# Patient Record
Sex: MALE | Race: WHITE | Employment: FULL TIME
[De-identification: names, ages, dates, MRNs, and addresses within clinical notes are randomized per-mention and may not be internally consistent; named-entity substitution may affect disease eponyms.]

---

## 2017-02-21 ENCOUNTER — SURGERY (OUTPATIENT)
Age: 27
End: 2017-02-21

## 2017-02-21 ENCOUNTER — ANESTHESIA (OUTPATIENT)
Dept: SURGERY | Facility: HOSPITAL | Age: 27
DRG: 339 | End: 2017-02-21
Payer: COMMERCIAL

## 2017-02-21 ENCOUNTER — APPOINTMENT (OUTPATIENT)
Dept: CT IMAGING | Facility: HOSPITAL | Age: 27
DRG: 339 | End: 2017-02-21
Attending: NURSE PRACTITIONER
Payer: COMMERCIAL

## 2017-02-21 ENCOUNTER — HOSPITAL ENCOUNTER (INPATIENT)
Facility: HOSPITAL | Age: 27
LOS: 3 days | Discharge: HOME | DRG: 339 | End: 2017-02-24
Attending: SURGERY | Admitting: HOSPITALIST
Payer: COMMERCIAL

## 2017-02-21 ENCOUNTER — ANESTHESIA EVENT (OUTPATIENT)
Dept: SURGERY | Facility: HOSPITAL | Age: 27
DRG: 339 | End: 2017-02-21
Payer: COMMERCIAL

## 2017-02-21 DIAGNOSIS — K35.80 ACUTE APPENDICITIS, UNSPECIFIED ACUTE APPENDICITIS TYPE: Primary | ICD-10-CM

## 2017-02-21 LAB
ALBUMIN SERPL BCP-MCNC: 4.6 G/DL (ref 3.5–4.8)
ALP SERPL-CCNC: 95 U/L (ref 32–100)
ALT SERPL-CCNC: 39 U/L (ref 17–63)
ANION GAP SERPL CALC-SCNC: 12 MMOL/L (ref 0–18)
AST SERPL-CCNC: 29 U/L (ref 15–41)
BACTERIA UR QL AUTO: NEGATIVE /HPF
BASOPHILS # BLD: 0 K/UL (ref 0–0.2)
BASOPHILS NFR BLD: 0 %
BILIRUB DIRECT SERPL-MCNC: 0.2 MG/DL (ref 0–0.2)
BILIRUB SERPL-MCNC: 1.3 MG/DL (ref 0.3–1.2)
BILIRUB UR QL: NEGATIVE
BUN SERPL-MCNC: 9 MG/DL (ref 8–20)
BUN/CREAT SERPL: 9.8 (ref 10–20)
CALCIUM SERPL-MCNC: 10.1 MG/DL (ref 8.5–10.5)
CHLORIDE SERPL-SCNC: 102 MMOL/L (ref 95–110)
CO2 SERPL-SCNC: 26 MMOL/L (ref 22–32)
COLOR UR: YELLOW
CREAT SERPL-MCNC: 0.92 MG/DL (ref 0.5–1.5)
EOSINOPHIL # BLD: 0.1 K/UL (ref 0–0.7)
EOSINOPHIL NFR BLD: 1 %
ERYTHROCYTE [DISTWIDTH] IN BLOOD BY AUTOMATED COUNT: 12.3 % (ref 11–15)
GLUCOSE SERPL-MCNC: 108 MG/DL (ref 70–99)
GLUCOSE UR-MCNC: NEGATIVE MG/DL
HCT VFR BLD AUTO: 49.5 % (ref 41–52)
HGB BLD-MCNC: 16.8 G/DL (ref 13.5–17.5)
HGB UR QL STRIP.AUTO: NEGATIVE
KETONES UR-MCNC: 20 MG/DL
LEUKOCYTE ESTERASE UR QL STRIP.AUTO: NEGATIVE
LIPASE SERPL-CCNC: 34 U/L (ref 22–51)
LYMPHOCYTES # BLD: 1.4 K/UL (ref 1–4)
LYMPHOCYTES NFR BLD: 9 %
MCH RBC QN AUTO: 30.2 PG (ref 27–32)
MCHC RBC AUTO-ENTMCNC: 34 G/DL (ref 32–37)
MCV RBC AUTO: 88.7 FL (ref 80–100)
MONOCYTES # BLD: 1 K/UL (ref 0–1)
MONOCYTES NFR BLD: 6 %
NEUTROPHILS # BLD AUTO: 13.5 K/UL (ref 1.8–7.7)
NEUTROPHILS NFR BLD: 84 %
NITRITE UR QL STRIP.AUTO: NEGATIVE
OSMOLALITY UR CALC.SUM OF ELEC: 289 MOSM/KG (ref 275–295)
PH UR: 5 [PH] (ref 5–8)
PLATELET # BLD AUTO: 252 K/UL (ref 140–400)
PMV BLD AUTO: 9.8 FL (ref 7.4–10.3)
POTASSIUM SERPL-SCNC: 3.7 MMOL/L (ref 3.3–5.1)
PROT SERPL-MCNC: 8.3 G/DL (ref 5.9–8.4)
PROT UR-MCNC: NEGATIVE MG/DL
RBC # BLD AUTO: 5.57 M/UL (ref 4.5–5.9)
RBC #/AREA URNS AUTO: <1 /HPF
SODIUM SERPL-SCNC: 140 MMOL/L (ref 136–144)
SP GR UR STRIP: 1.03 (ref 1–1.03)
UROBILINOGEN UR STRIP-ACNC: <2
VIT C UR-MCNC: NEGATIVE MG/DL
WBC # BLD AUTO: 16 K/UL (ref 4–11)
WBC #/AREA URNS AUTO: 1 /HPF

## 2017-02-21 PROCEDURE — 87075 CULTR BACTERIA EXCEPT BLOOD: CPT | Performed by: SURGERY

## 2017-02-21 PROCEDURE — 0DTJ4ZZ RESECTION OF APPENDIX, PERCUTANEOUS ENDOSCOPIC APPROACH: ICD-10-PCS | Performed by: SURGERY

## 2017-02-21 PROCEDURE — 96365 THER/PROPH/DIAG IV INF INIT: CPT

## 2017-02-21 PROCEDURE — 80076 HEPATIC FUNCTION PANEL: CPT | Performed by: NURSE PRACTITIONER

## 2017-02-21 PROCEDURE — 83690 ASSAY OF LIPASE: CPT | Performed by: NURSE PRACTITIONER

## 2017-02-21 PROCEDURE — 96361 HYDRATE IV INFUSION ADD-ON: CPT

## 2017-02-21 PROCEDURE — 87070 CULTURE OTHR SPECIMN AEROBIC: CPT | Performed by: SURGERY

## 2017-02-21 PROCEDURE — 87205 SMEAR GRAM STAIN: CPT | Performed by: SURGERY

## 2017-02-21 PROCEDURE — 81003 URINALYSIS AUTO W/O SCOPE: CPT

## 2017-02-21 PROCEDURE — 87077 CULTURE AEROBIC IDENTIFY: CPT | Performed by: SURGERY

## 2017-02-21 PROCEDURE — 85025 COMPLETE CBC W/AUTO DIFF WBC: CPT

## 2017-02-21 PROCEDURE — 99285 EMERGENCY DEPT VISIT HI MDM: CPT

## 2017-02-21 PROCEDURE — 80048 BASIC METABOLIC PNL TOTAL CA: CPT

## 2017-02-21 PROCEDURE — 96375 TX/PRO/DX INJ NEW DRUG ADDON: CPT

## 2017-02-21 PROCEDURE — 74177 CT ABD & PELVIS W/CONTRAST: CPT

## 2017-02-21 PROCEDURE — 88304 TISSUE EXAM BY PATHOLOGIST: CPT | Performed by: SURGERY

## 2017-02-21 RX ORDER — HYDROCODONE BITARTRATE AND ACETAMINOPHEN 5; 325 MG/1; MG/1
1 TABLET ORAL AS NEEDED
Status: DISCONTINUED | OUTPATIENT
Start: 2017-02-21 | End: 2017-02-21 | Stop reason: HOSPADM

## 2017-02-21 RX ORDER — FAMOTIDINE 20 MG/1
20 TABLET ORAL ONCE
Status: COMPLETED | OUTPATIENT
Start: 2017-02-21 | End: 2017-02-21

## 2017-02-21 RX ORDER — METOCLOPRAMIDE 10 MG/1
10 TABLET ORAL ONCE
Status: COMPLETED | OUTPATIENT
Start: 2017-02-21 | End: 2017-02-21

## 2017-02-21 RX ORDER — ONDANSETRON 2 MG/ML
INJECTION INTRAMUSCULAR; INTRAVENOUS AS NEEDED
Status: DISCONTINUED | OUTPATIENT
Start: 2017-02-21 | End: 2017-02-21 | Stop reason: SURG

## 2017-02-21 RX ORDER — HYDROMORPHONE HYDROCHLORIDE 1 MG/ML
0.4 INJECTION, SOLUTION INTRAMUSCULAR; INTRAVENOUS; SUBCUTANEOUS EVERY 5 MIN PRN
Status: DISCONTINUED | OUTPATIENT
Start: 2017-02-21 | End: 2017-02-21 | Stop reason: HOSPADM

## 2017-02-21 RX ORDER — ONDANSETRON 2 MG/ML
8 INJECTION INTRAMUSCULAR; INTRAVENOUS EVERY 4 HOURS PRN
Status: DISCONTINUED | OUTPATIENT
Start: 2017-02-21 | End: 2017-02-24

## 2017-02-21 RX ORDER — MIDAZOLAM HYDROCHLORIDE 1 MG/ML
INJECTION INTRAMUSCULAR; INTRAVENOUS AS NEEDED
Status: DISCONTINUED | OUTPATIENT
Start: 2017-02-21 | End: 2017-02-21 | Stop reason: SURG

## 2017-02-21 RX ORDER — DEXTROSE, SODIUM CHLORIDE, AND POTASSIUM CHLORIDE 5; .45; .15 G/100ML; G/100ML; G/100ML
INJECTION INTRAVENOUS CONTINUOUS
Status: DISCONTINUED | OUTPATIENT
Start: 2017-02-22 | End: 2017-02-24

## 2017-02-21 RX ORDER — BUPIVACAINE HYDROCHLORIDE AND EPINEPHRINE 2.5; 5 MG/ML; UG/ML
INJECTION, SOLUTION INFILTRATION; PERINEURAL AS NEEDED
Status: DISCONTINUED | OUTPATIENT
Start: 2017-02-21 | End: 2017-02-21 | Stop reason: HOSPADM

## 2017-02-21 RX ORDER — ACETAMINOPHEN 500 MG
1000 TABLET ORAL EVERY 8 HOURS
Status: DISCONTINUED | OUTPATIENT
Start: 2017-02-21 | End: 2017-02-22

## 2017-02-21 RX ORDER — ONDANSETRON 2 MG/ML
4 INJECTION INTRAMUSCULAR; INTRAVENOUS ONCE
Status: COMPLETED | OUTPATIENT
Start: 2017-02-21 | End: 2017-02-21

## 2017-02-21 RX ORDER — MORPHINE SULFATE 4 MG/ML
4 INJECTION, SOLUTION INTRAMUSCULAR; INTRAVENOUS EVERY 10 MIN PRN
Status: DISCONTINUED | OUTPATIENT
Start: 2017-02-21 | End: 2017-02-21 | Stop reason: HOSPADM

## 2017-02-21 RX ORDER — SODIUM CHLORIDE 0.9 % (FLUSH) 0.9 %
10 SYRINGE (ML) INJECTION AS NEEDED
Status: DISCONTINUED | OUTPATIENT
Start: 2017-02-21 | End: 2017-02-24

## 2017-02-21 RX ORDER — HYDROMORPHONE HYDROCHLORIDE 1 MG/ML
0.6 INJECTION, SOLUTION INTRAMUSCULAR; INTRAVENOUS; SUBCUTANEOUS EVERY 5 MIN PRN
Status: DISCONTINUED | OUTPATIENT
Start: 2017-02-21 | End: 2017-02-21 | Stop reason: HOSPADM

## 2017-02-21 RX ORDER — NALOXONE HYDROCHLORIDE 0.4 MG/ML
80 INJECTION, SOLUTION INTRAMUSCULAR; INTRAVENOUS; SUBCUTANEOUS AS NEEDED
Status: DISCONTINUED | OUTPATIENT
Start: 2017-02-21 | End: 2017-02-21 | Stop reason: HOSPADM

## 2017-02-21 RX ORDER — MORPHINE SULFATE 10 MG/ML
6 INJECTION, SOLUTION INTRAMUSCULAR; INTRAVENOUS EVERY 10 MIN PRN
Status: DISCONTINUED | OUTPATIENT
Start: 2017-02-21 | End: 2017-02-21 | Stop reason: HOSPADM

## 2017-02-21 RX ORDER — SUCCINYLCHOLINE CHLORIDE 20 MG/ML
INJECTION INTRAMUSCULAR; INTRAVENOUS AS NEEDED
Status: DISCONTINUED | OUTPATIENT
Start: 2017-02-21 | End: 2017-02-21 | Stop reason: SURG

## 2017-02-21 RX ORDER — HALOPERIDOL 5 MG/ML
0.25 INJECTION INTRAMUSCULAR ONCE AS NEEDED
Status: DISCONTINUED | OUTPATIENT
Start: 2017-02-21 | End: 2017-02-21 | Stop reason: HOSPADM

## 2017-02-21 RX ORDER — MAGNESIUM OXIDE 400 MG (241.3 MG MAGNESIUM) TABLET
400 TABLET DAILY
Status: DISCONTINUED | OUTPATIENT
Start: 2017-02-22 | End: 2017-02-24

## 2017-02-21 RX ORDER — ACETAMINOPHEN 10 MG/ML
INJECTION, SOLUTION INTRAVENOUS AS NEEDED
Status: DISCONTINUED | OUTPATIENT
Start: 2017-02-21 | End: 2017-02-21 | Stop reason: SURG

## 2017-02-21 RX ORDER — SODIUM CHLORIDE, SODIUM LACTATE, POTASSIUM CHLORIDE, CALCIUM CHLORIDE 600; 310; 30; 20 MG/100ML; MG/100ML; MG/100ML; MG/100ML
INJECTION, SOLUTION INTRAVENOUS CONTINUOUS
Status: DISCONTINUED | OUTPATIENT
Start: 2017-02-21 | End: 2017-02-21

## 2017-02-21 RX ORDER — ACETAMINOPHEN 325 MG/1
650 TABLET ORAL ONCE
Status: COMPLETED | OUTPATIENT
Start: 2017-02-21 | End: 2017-02-21

## 2017-02-21 RX ORDER — ENOXAPARIN SODIUM 100 MG/ML
30 INJECTION SUBCUTANEOUS EVERY 12 HOURS
Status: DISCONTINUED | OUTPATIENT
Start: 2017-02-22 | End: 2017-02-24

## 2017-02-21 RX ORDER — ONDANSETRON 2 MG/ML
4 INJECTION INTRAMUSCULAR; INTRAVENOUS ONCE AS NEEDED
Status: DISCONTINUED | OUTPATIENT
Start: 2017-02-21 | End: 2017-02-21 | Stop reason: HOSPADM

## 2017-02-21 RX ORDER — ACETAMINOPHEN 10 MG/ML
1000 INJECTION, SOLUTION INTRAVENOUS EVERY 6 HOURS
Status: DISCONTINUED | OUTPATIENT
Start: 2017-02-22 | End: 2017-02-24

## 2017-02-21 RX ORDER — LIDOCAINE HYDROCHLORIDE 10 MG/ML
INJECTION, SOLUTION EPIDURAL; INFILTRATION; INTRACAUDAL; PERINEURAL AS NEEDED
Status: DISCONTINUED | OUTPATIENT
Start: 2017-02-21 | End: 2017-02-21 | Stop reason: SURG

## 2017-02-21 RX ORDER — MORPHINE SULFATE 2 MG/ML
2 INJECTION, SOLUTION INTRAMUSCULAR; INTRAVENOUS EVERY 10 MIN PRN
Status: DISCONTINUED | OUTPATIENT
Start: 2017-02-21 | End: 2017-02-21 | Stop reason: HOSPADM

## 2017-02-21 RX ORDER — ROCURONIUM BROMIDE 10 MG/ML
INJECTION, SOLUTION INTRAVENOUS AS NEEDED
Status: DISCONTINUED | OUTPATIENT
Start: 2017-02-21 | End: 2017-02-21 | Stop reason: SURG

## 2017-02-21 RX ORDER — KETOROLAC TROMETHAMINE 30 MG/ML
30 INJECTION, SOLUTION INTRAMUSCULAR; INTRAVENOUS EVERY 6 HOURS PRN
Status: ACTIVE | OUTPATIENT
Start: 2017-02-21 | End: 2017-02-23

## 2017-02-21 RX ORDER — HYDROCODONE BITARTRATE AND ACETAMINOPHEN 5; 325 MG/1; MG/1
2 TABLET ORAL AS NEEDED
Status: DISCONTINUED | OUTPATIENT
Start: 2017-02-21 | End: 2017-02-21 | Stop reason: HOSPADM

## 2017-02-21 RX ORDER — GLYCOPYRROLATE 0.2 MG/ML
INJECTION INTRAMUSCULAR; INTRAVENOUS AS NEEDED
Status: DISCONTINUED | OUTPATIENT
Start: 2017-02-21 | End: 2017-02-21 | Stop reason: SURG

## 2017-02-21 RX ORDER — NEOSTIGMINE METHYLSULFATE 0.5 MG/ML
INJECTION INTRAVENOUS AS NEEDED
Status: DISCONTINUED | OUTPATIENT
Start: 2017-02-21 | End: 2017-02-21 | Stop reason: SURG

## 2017-02-21 RX ORDER — HEPARIN SODIUM 1000 [USP'U]/ML
INJECTION, SOLUTION INTRAVENOUS; SUBCUTANEOUS AS NEEDED
Status: DISCONTINUED | OUTPATIENT
Start: 2017-02-21 | End: 2017-02-21 | Stop reason: HOSPADM

## 2017-02-21 RX ORDER — ALVIMOPAN 12 MG/1
12 CAPSULE ORAL 2 TIMES DAILY
Status: DISCONTINUED | OUTPATIENT
Start: 2017-02-22 | End: 2017-02-24

## 2017-02-21 RX ORDER — KETOROLAC TROMETHAMINE 30 MG/ML
15 INJECTION, SOLUTION INTRAMUSCULAR; INTRAVENOUS EVERY 6 HOURS PRN
Status: ACTIVE | OUTPATIENT
Start: 2017-02-21 | End: 2017-02-23

## 2017-02-21 RX ORDER — HYDROMORPHONE HYDROCHLORIDE 1 MG/ML
0.2 INJECTION, SOLUTION INTRAMUSCULAR; INTRAVENOUS; SUBCUTANEOUS EVERY 5 MIN PRN
Status: DISCONTINUED | OUTPATIENT
Start: 2017-02-21 | End: 2017-02-21 | Stop reason: HOSPADM

## 2017-02-21 RX ORDER — KETOROLAC TROMETHAMINE 30 MG/ML
30 INJECTION, SOLUTION INTRAMUSCULAR; INTRAVENOUS ONCE
Status: COMPLETED | OUTPATIENT
Start: 2017-02-21 | End: 2017-02-21

## 2017-02-21 RX ADMIN — NEOSTIGMINE METHYLSULFATE 4 MG: 0.5 INJECTION INTRAVENOUS at 21:15:00

## 2017-02-21 RX ADMIN — LIDOCAINE HYDROCHLORIDE 50 MG: 10 INJECTION, SOLUTION EPIDURAL; INFILTRATION; INTRACAUDAL; PERINEURAL at 20:08:00

## 2017-02-21 RX ADMIN — SUCCINYLCHOLINE CHLORIDE 140 MG: 20 INJECTION INTRAMUSCULAR; INTRAVENOUS at 20:09:00

## 2017-02-21 RX ADMIN — MIDAZOLAM HYDROCHLORIDE 2 MG: 1 INJECTION INTRAMUSCULAR; INTRAVENOUS at 20:05:00

## 2017-02-21 RX ADMIN — ROCURONIUM BROMIDE 10 MG: 10 INJECTION, SOLUTION INTRAVENOUS at 20:08:00

## 2017-02-21 RX ADMIN — SODIUM CHLORIDE, SODIUM LACTATE, POTASSIUM CHLORIDE, CALCIUM CHLORIDE: 600; 310; 30; 20 INJECTION, SOLUTION INTRAVENOUS at 21:38:00

## 2017-02-21 RX ADMIN — ACETAMINOPHEN 1000 MG: 10 INJECTION, SOLUTION INTRAVENOUS at 21:19:00

## 2017-02-21 RX ADMIN — GLYCOPYRROLATE 0.2 MG: 0.2 INJECTION INTRAMUSCULAR; INTRAVENOUS at 21:15:00

## 2017-02-21 RX ADMIN — ONDANSETRON 4 MG: 2 INJECTION INTRAMUSCULAR; INTRAVENOUS at 21:10:00

## 2017-02-21 NOTE — ED PROVIDER NOTES
Patient Seen in: [facility] Emergency Department    History   CC: abd pain  HPI: Ifeoma Cardenas 32year old male  who presents to the ER c/o diffuse abdominal pain starting at 10 PM yesterday evening but progressively getting worse into to good aeration with equal, even expansion bilaterally   CV - RRR, no murmur  GI - Appears round and flat, BS +x4 quadrants, + RLQ and LLQ tenderness/guarding with palpation, - rebound tenderness, - McBurneys, - Rovsings   - no CVA tenderness, + circumcise Zosyn 3.375 mg IV and states surgery will call for the patient in the next 30–45 minutes. Primary nurse aware as well as patient.     Disposition and Plan     Clinical Impression:  Acute appendicitis, unspecified acute appendicitis type  (primary encounter

## 2017-02-21 NOTE — ED NOTES
Pt states here for abdominal pain that extends from mid abdomen to his testicles. Pt states he has been vomiting since last night, few episodes of diarrhea, no fever. Last episode of puking was today x 2 in the waiting room.  Pt denies back pain, no burning

## 2017-02-21 NOTE — ED INITIAL ASSESSMENT (HPI)
Pt reports n/v/d x1 day. Pt reports eating \"cheap [place] food\". Pt reports suprapubic pain. Pt denies urinary complaints. Pt reports testicular pain. Pt denies injury/trauma. Pt reports, \"There is no swelling, and they are both the same.  One testicle i

## 2017-02-22 LAB
ANION GAP SERPL CALC-SCNC: 11 MMOL/L (ref 0–18)
BASOPHILS # BLD: 0.1 K/UL (ref 0–0.2)
BASOPHILS NFR BLD: 1 %
BUN SERPL-MCNC: 9 MG/DL (ref 8–20)
BUN/CREAT SERPL: 8.2 (ref 10–20)
CALCIUM SERPL-MCNC: 9 MG/DL (ref 8.5–10.5)
CHLORIDE SERPL-SCNC: 102 MMOL/L (ref 95–110)
CO2 SERPL-SCNC: 27 MMOL/L (ref 22–32)
CREAT SERPL-MCNC: 1.1 MG/DL (ref 0.5–1.5)
EOSINOPHIL # BLD: 0.1 K/UL (ref 0–0.7)
EOSINOPHIL NFR BLD: 1 %
ERYTHROCYTE [DISTWIDTH] IN BLOOD BY AUTOMATED COUNT: 12.5 % (ref 11–15)
GLUCOSE SERPL-MCNC: 136 MG/DL (ref 70–99)
HCT VFR BLD AUTO: 42.4 % (ref 41–52)
HGB BLD-MCNC: 14.4 G/DL (ref 13.5–17.5)
LYMPHOCYTES # BLD: 1.7 K/UL (ref 1–4)
LYMPHOCYTES NFR BLD: 16 %
MAGNESIUM SERPL-MCNC: 2 MG/DL (ref 1.8–2.5)
MCH RBC QN AUTO: 30.5 PG (ref 27–32)
MCHC RBC AUTO-ENTMCNC: 33.9 G/DL (ref 32–37)
MCV RBC AUTO: 89.8 FL (ref 80–100)
MONOCYTES # BLD: 1.1 K/UL (ref 0–1)
MONOCYTES NFR BLD: 11 %
NEUTROPHILS # BLD AUTO: 7.3 K/UL (ref 1.8–7.7)
NEUTROPHILS NFR BLD: 72 %
OSMOLALITY UR CALC.SUM OF ELEC: 291 MOSM/KG (ref 275–295)
PHOSPHATE SERPL-MCNC: 4.8 MG/DL (ref 2.4–4.7)
PLATELET # BLD AUTO: 194 K/UL (ref 140–400)
PMV BLD AUTO: 9.5 FL (ref 7.4–10.3)
POTASSIUM SERPL-SCNC: 4.2 MMOL/L (ref 3.3–5.1)
RBC # BLD AUTO: 4.72 M/UL (ref 4.5–5.9)
SODIUM SERPL-SCNC: 140 MMOL/L (ref 136–144)
WBC # BLD AUTO: 10.2 K/UL (ref 4–11)

## 2017-02-22 PROCEDURE — 80048 BASIC METABOLIC PNL TOTAL CA: CPT | Performed by: SURGERY

## 2017-02-22 PROCEDURE — C9113 INJ PANTOPRAZOLE SODIUM, VIA: HCPCS | Performed by: SURGERY

## 2017-02-22 PROCEDURE — 83735 ASSAY OF MAGNESIUM: CPT | Performed by: SURGERY

## 2017-02-22 PROCEDURE — 84100 ASSAY OF PHOSPHORUS: CPT | Performed by: SURGERY

## 2017-02-22 PROCEDURE — 85025 COMPLETE CBC W/AUTO DIFF WBC: CPT | Performed by: SURGERY

## 2017-02-22 NOTE — OPERATIVE REPORT
[facility]    PATIENT'S NAME: Jeevan Julian   ATTENDING PHYSICIAN: Ioana May MD   OPERATING PHYSICIAN: Ioana May MD   PATIENT ACCOUNT#:   [de-identified]    LOCATION:  [address] [place] [address] #:   [id number] administered via endotracheal tube by Anesthesia. The patient's stomach was decompressed with a nasogastric tube, and the bladder was decompressed with a Pa catheter. The abdomen was prepped and draped in routine sterile fashion.   A small incision was bleeding present. A 10 mm  fully-perforated Denzel-Stephens drain was placed in the appendiceal abscess bed and brought out through the suprapubic 5 mm trocar site.   Prior to placing the drain, the fascia of the 12 mm trocar site was closed using Endo Close

## 2017-02-22 NOTE — ANESTHESIA POSTPROCEDURE EVALUATION
Patient: Case Bauer    Procedure Summary     Date Anesthesia Start Anesthesia Stop Room / Location    02/21/17 2006  [facility] MAIN OR 05 / [facility] MAIN OR       Procedure Diagnosis Surgeon Responsible Provider    LAPAROSCOPIC APPENDECTOMY (N/A ) Acute a

## 2017-02-22 NOTE — ANESTHESIA PREPROCEDURE EVALUATION
Anesthesia PreOp Note    HPI:     Anais Altamirano is a 32year old male who presents for preoperative consultation requested by: Mar Muniz MD    Date of Surgery: 2/21/2017    Procedure(s):  LAPAROSCOPIC APPENDECTOMY  Indication: Acute appe  02/21/2017   CO2 26 02/21/2017   BUN 9 02/21/2017   CREATSERUM 0.92 02/21/2017   * 02/21/2017   CA 10.1 02/21/2017          Vital Signs: Body mass index is 44.48 kg/(m^2). height is 1.778 m (5' 10\") and weight is 140.615 kg (310 lb).  Hi

## 2017-02-22 NOTE — PROGRESS NOTES
[facility]    Progress Note    Ayala Schuster Patient Status:  Inpatient    3/27/1990 MRN [id number]   Location [facility] 4W/SW/SE Attending Senia Yoder MD   Hosp Day # 1 PCP Adelso Valles.  Roni Ulloa MD       Subjective: 2. Cholelithiasis without CT evidence of acute complication. 3. Mild splenomegaly. 4. Lesser incidental findings as above. Results of this examination were discussed with the patient's ER provider, Elinor Bar, by Dr. Carrol Garcia at 0873 on 02/21/2017.

## 2017-02-22 NOTE — PROGRESS NOTES
[facility] Pharmacy Note: Antimicrobial Weight Dose Adjustment for: Zosyn (piperacillin/tazobactam)    Nishi Morin is a 32year old male who has been prescribed Zosyn (piperacillin/tazobactam) 3,375 gm ivpb q8hrs.   CrCl is estimated creatinine clearanc

## 2017-02-22 NOTE — PLAN OF CARE
DISCHARGE PLANNING    • Discharge to home or other facility with appropriate resources Progressing        GASTROINTESTINAL - ADULT    • Minimal or absence of nausea and vomiting Progressing    • Maintains or returns to baseline bowel function Progressing

## 2017-02-22 NOTE — PROGRESS NOTES
[facility] Pharmacy Note:  Weight Dose Adjustment for: Enoxaparin  Yessy Díaz is a 32year old male who has been prescribed Enoxaparin 40mg sc daily. CrCl is estimated creatinine clearance is 125.6 mL/min (based on Cr of 0.92).  and pt has a weight/BM

## 2017-02-22 NOTE — BRIEF OP NOTE
[facility] OPERATING ROOM  Brief Op Note     Lola Saldaña Location: OR   [facility] [id number] MRN [id number]   Admission Date 2/21/2017 Operation Date 2/21/2017   Attending Physician Tory Villalba MD Operating Physician Starr Jauregui

## 2017-02-22 NOTE — CONSULTS
[facility] HOSP - [facility]    Report of Consultation    Walter Rico Patient Status:  Emergency    3/27/1990 MRN [id number]   Location [address] [facility] Attending Jose A Loaiza MD   Hosp Day # 0 PCP Elizabeth Thompson.  Sue Sellers MD morphINE sulfate (PF) 4 MG/ML injection 4 mg, 4 mg, Intravenous, Q10 Min PRN  •  morphINE sulfate (PF) 10 MG/ML injection 6 mg, 6 mg, Intravenous, Q10 Min PRN  •  Atropine Sulfate 0.1 MG/ML injection 0.5 mg, 0.5 mg, Intravenous, PRN  •  ondansetron HCl (ZO Abdomen+pelvis(contrast Only)(cpt=74177)    2/21/2017  CONCLUSION:  1. Acute appendicitis with moderate appendicitis, but without evidence of periappendiceal abscess formation. 2. Cholelithiasis without CT evidence of acute complication.   3. Mild splenome counseling/coordination of care:    41798- 55 min    ANNELIESE MENDOZA  2/21/2017  8:04 PM

## 2017-02-23 LAB
ANION GAP SERPL CALC-SCNC: 6 MMOL/L (ref 0–18)
BASOPHILS # BLD: 0 K/UL (ref 0–0.2)
BASOPHILS NFR BLD: 0 %
BUN SERPL-MCNC: 4 MG/DL (ref 8–20)
BUN/CREAT SERPL: 4 (ref 10–20)
CALCIUM SERPL-MCNC: 9.1 MG/DL (ref 8.5–10.5)
CHLORIDE SERPL-SCNC: 105 MMOL/L (ref 95–110)
CHOLEST SERPL-MCNC: 145 MG/DL (ref 110–200)
CO2 SERPL-SCNC: 29 MMOL/L (ref 22–32)
CREAT SERPL-MCNC: 0.99 MG/DL (ref 0.5–1.5)
EOSINOPHIL # BLD: 0.2 K/UL (ref 0–0.7)
EOSINOPHIL NFR BLD: 2 %
ERYTHROCYTE [DISTWIDTH] IN BLOOD BY AUTOMATED COUNT: 12.3 % (ref 11–15)
GLUCOSE SERPL-MCNC: 117 MG/DL (ref 70–99)
HBA1C MFR BLD: 5.4 % (ref 4–6)
HCT VFR BLD AUTO: 42.5 % (ref 41–52)
HDLC SERPL-MCNC: 40 MG/DL
HGB BLD-MCNC: 14.3 G/DL (ref 13.5–17.5)
LDLC SERPL CALC-MCNC: 91 MG/DL (ref 0–99)
LYMPHOCYTES # BLD: 1.6 K/UL (ref 1–4)
LYMPHOCYTES NFR BLD: 20 %
MAGNESIUM SERPL-MCNC: 2.3 MG/DL (ref 1.8–2.5)
MCH RBC QN AUTO: 30 PG (ref 27–32)
MCHC RBC AUTO-ENTMCNC: 33.6 G/DL (ref 32–37)
MCV RBC AUTO: 89.5 FL (ref 80–100)
MONOCYTES # BLD: 0.7 K/UL (ref 0–1)
MONOCYTES NFR BLD: 9 %
NEUTROPHILS # BLD AUTO: 5.5 K/UL (ref 1.8–7.7)
NEUTROPHILS NFR BLD: 69 %
NONHDLC SERPL-MCNC: 105 MG/DL
OSMOLALITY UR CALC.SUM OF ELEC: 288 MOSM/KG (ref 275–295)
PHOSPHATE SERPL-MCNC: 3.3 MG/DL (ref 2.4–4.7)
PLATELET # BLD AUTO: 219 K/UL (ref 140–400)
PMV BLD AUTO: 9.5 FL (ref 7.4–10.3)
POTASSIUM SERPL-SCNC: 4.5 MMOL/L (ref 3.3–5.1)
RBC # BLD AUTO: 4.75 M/UL (ref 4.5–5.9)
SODIUM SERPL-SCNC: 140 MMOL/L (ref 136–144)
TRIGL SERPL-MCNC: 68 MG/DL (ref 1–149)
WBC # BLD AUTO: 8 K/UL (ref 4–11)

## 2017-02-23 PROCEDURE — 85025 COMPLETE CBC W/AUTO DIFF WBC: CPT | Performed by: SURGERY

## 2017-02-23 PROCEDURE — 83036 HEMOGLOBIN GLYCOSYLATED A1C: CPT | Performed by: HOSPITALIST

## 2017-02-23 PROCEDURE — 80048 BASIC METABOLIC PNL TOTAL CA: CPT | Performed by: SURGERY

## 2017-02-23 PROCEDURE — 83735 ASSAY OF MAGNESIUM: CPT | Performed by: SURGERY

## 2017-02-23 PROCEDURE — 84100 ASSAY OF PHOSPHORUS: CPT | Performed by: SURGERY

## 2017-02-23 PROCEDURE — 80061 LIPID PANEL: CPT | Performed by: HOSPITALIST

## 2017-02-23 PROCEDURE — C9113 INJ PANTOPRAZOLE SODIUM, VIA: HCPCS | Performed by: SURGERY

## 2017-02-23 RX ORDER — HYDROCODONE BITARTRATE AND ACETAMINOPHEN 7.5; 325 MG/1; MG/1
1-2 TABLET ORAL EVERY 6 HOURS PRN
Qty: 30 TABLET | Refills: 0 | Status: SHIPPED | OUTPATIENT
Start: 2017-02-23 | End: 2017-09-05 | Stop reason: ALTCHOICE

## 2017-02-23 RX ORDER — DOCUSATE SODIUM 100 MG/1
100 CAPSULE, LIQUID FILLED ORAL 2 TIMES DAILY
Qty: 14 CAPSULE | Refills: 1 | Status: SHIPPED | OUTPATIENT
Start: 2017-02-23 | End: 2017-03-02

## 2017-02-23 NOTE — PROGRESS NOTES
[facility]    Progress Note    Kira Smaller Patient Status:  Inpatient    3/27/1990 MRN [id number]   Location [facility] 4W/SW/SE Attending Luis Barboza MD   Hosp Day # 2 PCP .  Janus Closs, MD       Subjective: 102  105   CO2  26  27  29   ALKPHO  95   --    --    AST  29   --    --    ALT  39   --    --    BILT  1.3*   --    --    TP  8.3   --    --              Ct Abdomen+pelvis(contrast Only)(cpt=74177)    2/21/2017  CONCLUSION:  1.  Acute appendicitis with mod

## 2017-02-23 NOTE — PROGRESS NOTES
[facility] Hospitalist Progress Note     CC: Hospital Follow up    PCP: Ebony Gutierres.  Viri Jiang MD       Assessment/Plan:     Principal Problem:    Acute appendicitis, unspecified acute appendicitis type  Active Problems:    Acute appendicitis  Patient is a 32year old m 3.2 oz (129.366 kg) (100 %*, Z = 2.90)    * Growth percentiles are based on [facility] 2-20 Years data.     Exam   Gen: No acute distress, alert and oriented x3, no focal neurologic deficits  Heent: NC, NG in place  Pulm: Lungs clear, normal respiratory effort  CV Intravenous Daily     • morphine 1mg/mL in NS 30 mL     • KCl in Dextrose-NaCl 83 mL/hr (02/23/17 1218)     influenza virus vaccine PF, Normal Saline Flush, ketorolac (TORADOL) injection **OR** ketorolac (TORADOL) injection, ondansetron HCl

## 2017-02-23 NOTE — H&P
[facility] Hospitalist H&P       CC: Patient presents with:  Nausea/Vomiting/Diarrhea (gastrointestinal)       PCP: None    History of Present Illness: Patient is a 32year old male with PMH sig for growth hormone def, who presented with acute onset abd pain, f to auscultation bilaterally. Normal effort   Chest wall:  No tenderness or deformity. Heart:  Regular rate and rhythm, S1, S2 normal, no murmur, rub or gallop appreciated   Abdomen:   Soft, appropriately tender to palpation.  Bowel sounds normal. No kadi surgery    obease   - check a1c, lipids in am  - diet and weight loss counseling on DC    FN:  - IVF: 83/hr  - Diet: npo     DVT Prophy:lovenox, scd  Atrophy: is  Lines: piv,     Dispo: pending clinical course    Outpatient records or previous hospital rec

## 2017-02-24 VITALS
DIASTOLIC BLOOD PRESSURE: 68 MMHG | RESPIRATION RATE: 16 BRPM | HEIGHT: 70 IN | SYSTOLIC BLOOD PRESSURE: 126 MMHG | TEMPERATURE: 98 F | WEIGHT: 310 LBS | HEART RATE: 82 BPM | OXYGEN SATURATION: 96 % | BODY MASS INDEX: 44.38 KG/M2

## 2017-02-24 LAB
BASOPHILS # BLD: 0.1 K/UL (ref 0–0.2)
BASOPHILS NFR BLD: 1 %
EOSINOPHIL # BLD: 0.3 K/UL (ref 0–0.7)
EOSINOPHIL NFR BLD: 4 %
ERYTHROCYTE [DISTWIDTH] IN BLOOD BY AUTOMATED COUNT: 12.5 % (ref 11–15)
HCT VFR BLD AUTO: 41.9 % (ref 41–52)
HGB BLD-MCNC: 14.2 G/DL (ref 13.5–17.5)
LYMPHOCYTES # BLD: 1.8 K/UL (ref 1–4)
LYMPHOCYTES NFR BLD: 22 %
MCH RBC QN AUTO: 30.7 PG (ref 27–32)
MCHC RBC AUTO-ENTMCNC: 33.9 G/DL (ref 32–37)
MCV RBC AUTO: 90.5 FL (ref 80–100)
MONOCYTES # BLD: 0.7 K/UL (ref 0–1)
MONOCYTES NFR BLD: 8 %
NEUTROPHILS # BLD AUTO: 5.4 K/UL (ref 1.8–7.7)
NEUTROPHILS NFR BLD: 66 %
PLATELET # BLD AUTO: 248 K/UL (ref 140–400)
PMV BLD AUTO: 9.6 FL (ref 7.4–10.3)
RBC # BLD AUTO: 4.63 M/UL (ref 4.5–5.9)
WBC # BLD AUTO: 8.3 K/UL (ref 4–11)

## 2017-02-24 PROCEDURE — C9113 INJ PANTOPRAZOLE SODIUM, VIA: HCPCS | Performed by: SURGERY

## 2017-02-24 PROCEDURE — 85025 COMPLETE CBC W/AUTO DIFF WBC: CPT | Performed by: SURGERY

## 2017-02-24 RX ORDER — AMOXICILLIN AND CLAVULANATE POTASSIUM 875; 125 MG/1; MG/1
1 TABLET, FILM COATED ORAL 2 TIMES DAILY
Qty: 14 TABLET | Refills: 0 | Status: SHIPPED | OUTPATIENT
Start: 2017-02-24 | End: 2017-03-03

## 2017-02-24 RX ORDER — HYDROCODONE BITARTRATE AND ACETAMINOPHEN 5; 325 MG/1; MG/1
1 TABLET ORAL EVERY 6 HOURS PRN
Status: DISCONTINUED | OUTPATIENT
Start: 2017-02-24 | End: 2017-02-24

## 2017-02-24 RX ORDER — ACETAMINOPHEN 325 MG/1
650 TABLET ORAL EVERY 6 HOURS PRN
Status: DISCONTINUED | OUTPATIENT
Start: 2017-02-24 | End: 2017-02-24

## 2017-02-24 NOTE — DISCHARGE SUMMARY
General Medicine Discharge Summary     Patient ID:  Norberto Herrera  32year old  3/27/1990    Admit date: 2/21/2017    Discharge date and time:  2/24/17    Attending Physician: Silvia Can MD     Consults: IP CONSULT TO GENERAL SURGERY  IP CONSULT T to see PCP in 1-2 weeks on DC.       Acute perforated appendicitis  - S/p Lap appy on 2/21 per dr. Yovani Stark  - Pain control with norco prn  - tolerating LRD  - aumentin on DC  - fu with Dr. Arpit Madrid    - A1c 5.4, lipids LD < 100  - diet and pauly postoperatively    Contact information:    [address] [facility]  [phone]            DC instructions:       Other Discharge Instructions:       DISCHARGE INSTRUCTIONS-LAPAROSCOPIC APPENDECTOMY      Activity can be resumed as t firmly on the brake pedal without hesitation. · Ask others to help with chores and errands while you recover. · Don’t lift anything heavier than 10 pounds until your doctor says it’s OK.   · Don’t mow the lawn, shovel snow, use a vacuum , or do oth

## 2017-02-24 NOTE — PROGRESS NOTES
[facility]    Progress Note    Dora Brito Patient Status:  Inpatient    3/27/1990 MRN [id number]   Location [facility] 4W/SW/SE Attending Thanh Lubin MD   Hosp Day # 3 PCP Chela Louie.  Mikaela Bosch MD       Subjective: 4.5   CL  102  102  105   CO2  26  27  29   ALKPHO  95   --    --    AST  29   --    --    ALT  39   --    --    BILT  1.3*   --    --    TP  8.3   --    --      Adwoa Yun PA-C  02/24/2017  8:25 AM    Will d/w Dr. Edmundo Villalobos       The patient was exami

## 2017-09-18 PROBLEM — M87.00 AVASCULAR NECROSIS (HCC): Status: ACTIVE | Noted: 2017-09-18

## 2018-05-12 ENCOUNTER — CHARTING TRANS (OUTPATIENT)
Dept: OTHER | Age: 28
End: 2018-05-12

## 2018-11-01 VITALS
DIASTOLIC BLOOD PRESSURE: 70 MMHG | HEIGHT: 70 IN | OXYGEN SATURATION: 96 % | WEIGHT: 315 LBS | BODY MASS INDEX: 45.1 KG/M2 | RESPIRATION RATE: 20 BRPM | HEART RATE: 94 BPM | TEMPERATURE: 98.4 F | SYSTOLIC BLOOD PRESSURE: 120 MMHG

## 2022-01-15 ENCOUNTER — NURSE TRIAGE (OUTPATIENT)
Dept: SCHEDULING | Age: 32
End: 2022-01-15

## 2022-01-17 ENCOUNTER — OFFICE VISIT (OUTPATIENT)
Dept: INTERNAL MEDICINE CLINIC | Facility: CLINIC | Age: 32
End: 2022-01-17
Payer: COMMERCIAL

## 2022-01-17 ENCOUNTER — EKG ENCOUNTER (OUTPATIENT)
Dept: LAB | Facility: HOSPITAL | Age: 32
End: 2022-01-17
Attending: INTERNAL MEDICINE
Payer: COMMERCIAL

## 2022-01-17 VITALS
HEART RATE: 106 BPM | SYSTOLIC BLOOD PRESSURE: 134 MMHG | WEIGHT: 315 LBS | DIASTOLIC BLOOD PRESSURE: 84 MMHG | HEIGHT: 70 IN | BODY MASS INDEX: 45.1 KG/M2

## 2022-01-17 DIAGNOSIS — R07.9 CHEST PAIN, UNSPECIFIED TYPE: Primary | ICD-10-CM

## 2022-01-17 PROCEDURE — 93005 ELECTROCARDIOGRAM TRACING: CPT

## 2022-01-17 PROCEDURE — 99203 OFFICE O/P NEW LOW 30 MIN: CPT | Performed by: INTERNAL MEDICINE

## 2022-01-17 PROCEDURE — 3075F SYST BP GE 130 - 139MM HG: CPT | Performed by: INTERNAL MEDICINE

## 2022-01-17 PROCEDURE — 93010 ELECTROCARDIOGRAM REPORT: CPT | Performed by: INTERNAL MEDICINE

## 2022-01-17 PROCEDURE — 3008F BODY MASS INDEX DOCD: CPT | Performed by: INTERNAL MEDICINE

## 2022-01-17 PROCEDURE — 3079F DIAST BP 80-89 MM HG: CPT | Performed by: INTERNAL MEDICINE

## 2022-01-17 NOTE — PROGRESS NOTES
Argenis Nelson is a 32year old male. Patient presents with:  Chest Pain    HPI:   Caren Lee presents this afternoon for his initial visit for evaluation of chest pain. He slept on his couch at home on Wednesday night, 5 days ago.   When he awoke on Thu Drug use: No       EXAM:   GENERAL: Pleasant male appearing well in no distress  /84   Pulse 106   Ht 5' 10\" (1.778 m)   Wt (!) 321 lb 4.8 oz (145.7 kg)   BMI 46.10 kg/m²   HEENT: Anicteric, conjunctiva pink  NECK: Supple without mass or lymphadenop

## 2023-11-03 ENCOUNTER — OFFICE VISIT (OUTPATIENT)
Dept: FAMILY MEDICINE CLINIC | Facility: CLINIC | Age: 33
End: 2023-11-03
Payer: COMMERCIAL

## 2023-11-03 VITALS
SYSTOLIC BLOOD PRESSURE: 128 MMHG | WEIGHT: 315 LBS | RESPIRATION RATE: 18 BRPM | TEMPERATURE: 97 F | HEIGHT: 70 IN | HEART RATE: 88 BPM | OXYGEN SATURATION: 96 % | DIASTOLIC BLOOD PRESSURE: 88 MMHG | BODY MASS INDEX: 45.1 KG/M2

## 2023-11-03 DIAGNOSIS — J01.90 ACUTE BACTERIAL RHINOSINUSITIS: Primary | ICD-10-CM

## 2023-11-03 DIAGNOSIS — B96.89 ACUTE BACTERIAL RHINOSINUSITIS: Primary | ICD-10-CM

## 2023-11-03 DIAGNOSIS — R05.9 COUGH, UNSPECIFIED TYPE: ICD-10-CM

## 2023-11-03 PROCEDURE — 3074F SYST BP LT 130 MM HG: CPT | Performed by: PHYSICIAN ASSISTANT

## 2023-11-03 PROCEDURE — 3079F DIAST BP 80-89 MM HG: CPT | Performed by: PHYSICIAN ASSISTANT

## 2023-11-03 PROCEDURE — 99213 OFFICE O/P EST LOW 20 MIN: CPT | Performed by: PHYSICIAN ASSISTANT

## 2023-11-03 PROCEDURE — 3008F BODY MASS INDEX DOCD: CPT | Performed by: PHYSICIAN ASSISTANT

## 2023-11-03 RX ORDER — AMOXICILLIN AND CLAVULANATE POTASSIUM 875; 125 MG/1; MG/1
1 TABLET, FILM COATED ORAL 2 TIMES DAILY
Qty: 10 TABLET | Refills: 0 | Status: SHIPPED | OUTPATIENT
Start: 2023-11-03 | End: 2023-11-08

## (undated) DEVICE — X-STREAM LAPAROSCOPIC IRRIGATION TUBING SET WITH NEZHAT-DORSEY TRUMPET VALVE, AND COJOINED SUCTION/IRRIGATION TUBING, WITHOUT PROBE TIP: Brand: X-STREAM

## (undated) DEVICE — CULTURE TUBE ANAEROBIC

## (undated) DEVICE — SUTURE VICRYL 0 J906G

## (undated) DEVICE — SOL  .9 1000ML BTL

## (undated) DEVICE — STERILE LATEX POWDER-FREE SURGICAL GLOVESWITH NITRILE COATING: Brand: PROTEXIS

## (undated) DEVICE — DEVICE PORT SITE CLOSURE

## (undated) DEVICE — LAP CHOLE: Brand: MEDLINE INDUSTRIES, INC.

## (undated) DEVICE — SOL H2O 1000ML BTL

## (undated) DEVICE — CULTURE COLLECT/TRANSPORT SYS

## (undated) DEVICE — [HIGH FLOW INSUFFLATOR,  DO NOT USE IF PACKAGE IS DAMAGED,  KEEP DRY,  KEEP AWAY FROM SUNLIGHT,  PROTECT FROM HEAT AND RADIOACTIVE SOURCES.]: Brand: PNEUMOSURE

## (undated) DEVICE — LIGAMAX 5 MM ENDOSCOPIC MULTIPLE CLIP APPLIER: Brand: LIGAMAX

## (undated) DEVICE — ENDOPATH ETS-FLEX45 ARTICULATING ENDOSCOPIC LINEAR CUTTER, NO RELOAD: Brand: ENDOPATH

## (undated) DEVICE — DRAIN SILICONE FLAT 10MM

## (undated) DEVICE — INSUFFLATION NEEDLE TO ESTABLISH PNEUMOPERITONEUM.: Brand: INSUFFLATION NEEDLE

## (undated) DEVICE — TROCAR: Brand: KII® SLEEVE

## (undated) DEVICE — SOL  .9 3000ML

## (undated) DEVICE — UNDYED BRAIDED (POLYGLACTIN 910), SYNTHETIC ABSORBABLE SUTURE: Brand: COATED VICRYL

## (undated) DEVICE — SUCTION CANISTER, 3000CC,SAFELINER: Brand: DEROYAL

## (undated) DEVICE — DRAIN RESERVOIR RELIAVAC 100CC

## (undated) DEVICE — CHLORAPREP 26ML APPLICATOR

## (undated) DEVICE — DECANTER SPIKE TRANSFLOW

## (undated) DEVICE — CLIPPER BLADE 3M

## (undated) DEVICE — CATH SECURING DEVICE STATLOCK

## (undated) DEVICE — ENDOPATH ETS45 2.5MM RELOADS (VASCULAR/THIN): Brand: ENDOPATH

## (undated) DEVICE — SUTURE SILK 2-0 685H

## (undated) DEVICE — TROCAR: Brand: KII FIOS FIRST ENTRY

## (undated) DEVICE — TROCARS: Brand: KII® BALLOON BLUNT TIP SYSTEM

## (undated) NOTE — LETTER
February 24, 2017       Yessy Díaz   [address] [facility] [address]       To Whom It May Concern:    Yessy Díaz has been under our care regarding ongoing medical issues from 2/21/17 - 2/24/17.  Because of this, he has been

## (undated) NOTE — IP AVS SNAPSHOT
[address]  [address] [facility], [facility], Adi Fernandez ~ [phone]                Discharge Summary   2/21/2017    NEREIDA Shook 59           Admission Information        Provider Department    2/21/2017 [place] Riedel, MD [facility] 4w/ - docusate sodium 100 MG Caps  - hydrocodone-acetaminophen 7.5-325 MG Tabs              Patient Instructions       DISCHARGE INSTRUCTIONS-LAPAROSCOPIC APPENDECTOMY      Activity can be resumed as tolerated including walking, stairs, light exercise and driv · Ask others to help with chores and errands while you recover. · Don’t lift anything heavier than 10 pounds until your doctor says it’s OK.   · Don’t mow the lawn, shovel snow, use a vacuum , or do other strenuous activities until your doctor says Tb Intradermal Test 5/21/2008      Recent Hematology Lab Results  (Last 3 results in the past 90 days)    WBC RBC Hemoglobin Hematocrit MCV MCH MCHC RDW Platelet MPV    (28/38/07)  8.3 (02/24/17)  4.63 (02/24/17)  14.2 (02/24/17)  41.9 (02/24/17)  90.5 -- Most Recent Value    All belongings returned to patient at discharge Pt's bedside belongings    Medications Sent Home None to return    Medications Returned:           Additional Information       We are concerned for your overall well being:    - If you As your caregivers, we want you to be aware of the medications you are prescribed to take and their potential SIDE EFFECTS. Your nurse will review your medications with you before you are discharged, and can provide you with additional printed information.